# Patient Record
Sex: FEMALE | Race: WHITE | NOT HISPANIC OR LATINO | Employment: OTHER | URBAN - NONMETROPOLITAN AREA
[De-identification: names, ages, dates, MRNs, and addresses within clinical notes are randomized per-mention and may not be internally consistent; named-entity substitution may affect disease eponyms.]

---

## 2021-02-02 ENCOUNTER — IMMUNIZATION (OUTPATIENT)
Dept: VACCINE CLINIC | Facility: HOSPITAL | Age: 86
End: 2021-02-02

## 2021-02-02 PROCEDURE — 91300 HC SARSCOV02 VAC 30MCG/0.3ML IM: CPT | Performed by: FAMILY MEDICINE

## 2021-02-02 PROCEDURE — 0001A: CPT | Performed by: FAMILY MEDICINE

## 2021-02-23 ENCOUNTER — IMMUNIZATION (OUTPATIENT)
Dept: VACCINE CLINIC | Facility: HOSPITAL | Age: 86
End: 2021-02-23

## 2021-02-23 PROCEDURE — 91300 HC SARSCOV02 VAC 30MCG/0.3ML IM: CPT | Performed by: INTERNAL MEDICINE

## 2021-02-23 PROCEDURE — 0002A: CPT | Performed by: INTERNAL MEDICINE

## 2023-02-13 ENCOUNTER — OFFICE VISIT (OUTPATIENT)
Dept: CARDIOLOGY | Facility: CLINIC | Age: 88
End: 2023-02-13
Payer: MEDICARE

## 2023-02-13 VITALS
HEART RATE: 81 BPM | OXYGEN SATURATION: 95 % | WEIGHT: 158.6 LBS | HEIGHT: 66 IN | DIASTOLIC BLOOD PRESSURE: 70 MMHG | BODY MASS INDEX: 25.49 KG/M2 | SYSTOLIC BLOOD PRESSURE: 132 MMHG

## 2023-02-13 DIAGNOSIS — G47.33 OSA (OBSTRUCTIVE SLEEP APNEA): ICD-10-CM

## 2023-02-13 DIAGNOSIS — I73.9 PAD (PERIPHERAL ARTERY DISEASE): ICD-10-CM

## 2023-02-13 DIAGNOSIS — F17.211 CIGARETTE NICOTINE DEPENDENCE IN REMISSION: ICD-10-CM

## 2023-02-13 DIAGNOSIS — I25.10 CORONARY ARTERY DISEASE INVOLVING NATIVE CORONARY ARTERY OF NATIVE HEART WITHOUT ANGINA PECTORIS: ICD-10-CM

## 2023-02-13 DIAGNOSIS — I49.3 PREMATURE VENTRICULAR BEATS: ICD-10-CM

## 2023-02-13 DIAGNOSIS — I71.40 ABDOMINAL AORTIC ANEURYSM (AAA) WITHOUT RUPTURE, UNSPECIFIED PART: ICD-10-CM

## 2023-02-13 DIAGNOSIS — I10 ESSENTIAL HYPERTENSION: ICD-10-CM

## 2023-02-13 DIAGNOSIS — R06.02 SHORTNESS OF BREATH: Primary | ICD-10-CM

## 2023-02-13 DIAGNOSIS — Z95.828 HISTORY OF ENDOVASCULAR STENT GRAFT FOR ABDOMINAL AORTIC ANEURYSM: ICD-10-CM

## 2023-02-13 DIAGNOSIS — E78.5 DYSLIPIDEMIA: ICD-10-CM

## 2023-02-13 PROCEDURE — 99204 OFFICE O/P NEW MOD 45 MIN: CPT | Performed by: SPECIALIST

## 2023-02-13 RX ORDER — ASPIRIN 81 MG/1
1 TABLET, COATED ORAL DAILY
COMMUNITY
Start: 2022-12-05

## 2023-02-13 RX ORDER — ROSUVASTATIN CALCIUM 10 MG/1
TABLET, COATED ORAL
COMMUNITY
Start: 2023-02-07 | End: 2023-03-30 | Stop reason: SDUPTHER

## 2023-02-13 RX ORDER — LOSARTAN POTASSIUM 50 MG/1
1 TABLET ORAL DAILY
COMMUNITY
Start: 2022-12-03 | End: 2023-03-30 | Stop reason: SDUPTHER

## 2023-02-13 RX ORDER — OXYBUTYNIN CHLORIDE 5 MG/1
1 TABLET ORAL EVERY 12 HOURS SCHEDULED
COMMUNITY
Start: 2022-12-05

## 2023-02-13 NOTE — PROGRESS NOTES
Subjective   Initial consultation, abnormal EKG, CAD, PAD  Rashida Worthington is a 87 y.o. female who presents to day for Establish Care (ABNORMAL EKG-DENIES CARDIAC SYMPTOMS).    CHIEF COMPLIANT  Chief Complaint   Patient presents with   • Establish Care     ABNORMAL EKG-DENIES CARDIAC SYMPTOMS       Active Problems:  Problem List Items Addressed This Visit        Cardiac and Vasculature    PAD (peripheral artery disease) (HCC)    Relevant Orders    US AAA Screen Limited    AAA (abdominal aortic aneurysm) without rupture    Relevant Orders    US AAA Screen Limited    Dyslipidemia    Relevant Orders    Lipid Panel    Comprehensive Metabolic Panel    Essential hypertension    Relevant Medications    losartan (COZAAR) 50 MG tablet    Premature ventricular beats    Relevant Orders    Cardiac Event Monitor    Coronary artery disease involving native coronary artery of native heart without angina pectoris    Relevant Orders    Adult Transthoracic Echo Complete w/ Color, Spectral and Contrast if necessary per protocol       Pulmonary and Pneumonias    Shortness of breath - Primary    Relevant Orders    Adult Transthoracic Echo Complete w/ Color, Spectral and Contrast if necessary per protocol       Sleep    MADDIE (obstructive sleep apnea)    Relevant Orders    Home Sleep Study       Tobacco    Cigarette nicotine dependence in remission       HPI  HPI  Patient is referred because of abnormal EKG where she has also premature ventricular complexes she herself does not have any palpitations no history of chest pain but she does have a little bit of shortness of breath on exertion she did have some edema but this has resolved after she has some knee injection she is she used to smoke a little bit less than a pack per day for almost 50 years she quitted about 20 years ago after she had aortic abdominal aortic aneurysm repair and also bilateral femoral pop bypass with Dr. Smyth this has not been followed up regularly since he denies  any claudications she does have high blood pressure no diabetes but she does have hyperlipidemia she has been taking medicine for that but she does not know how is her cholesterol doing, no significant family history of coronary disease she has been using home oxygen at night she said that she is tired and she gets a little bit sleepy during the day and she snores quite a bit at night  PRIOR MEDS  Current Outpatient Medications on File Prior to Visit   Medication Sig Dispense Refill   • Aspirin Low Dose 81 MG EC tablet Take 1 tablet by mouth Daily.     • Calcium Citrate-Vitamin D (Citrus Calcium/Vitamin D) 200-6.25 MG-MCG tablet Take  by mouth.     • losartan (COZAAR) 50 MG tablet Take 1 tablet by mouth Daily.     • oxybutynin (DITROPAN) 5 MG tablet Take 1 tablet by mouth Every 12 (Twelve) Hours.     • rosuvastatin (CRESTOR) 10 MG tablet        No current facility-administered medications on file prior to visit.       ALLERGIES  Penicillins    HISTORY  Past Medical History:   Diagnosis Date   • Acid reflux    • Arthritis    • Colon polyps    • COPD (chronic obstructive pulmonary disease) (HCC)    • Hyperlipidemia    • Hypertension    • Osteoporosis    • Thyroid disease        Social History     Socioeconomic History   • Marital status:    Tobacco Use   • Smoking status: Former     Types: Cigarettes   Vaping Use   • Vaping Use: Never used   Substance and Sexual Activity   • Alcohol use: Never   • Sexual activity: Defer       Family History   Problem Relation Age of Onset   • Heart failure Mother    • Cancer Sister        Review of Systems   Constitutional: Negative.    HENT: Negative.    Eyes: Negative.    Respiratory: Positive for shortness of breath. Negative for apnea, cough, choking, chest tightness, wheezing and stridor.    Cardiovascular: Positive for leg swelling. Negative for chest pain and palpitations.   Gastrointestinal: Negative.    Endocrine: Negative.    Genitourinary: Negative.   "  Musculoskeletal: Negative.    Skin: Negative.    Allergic/Immunologic: Negative.    Neurological: Negative.    Hematological: Negative.    Psychiatric/Behavioral: Negative.        Objective     VITALS: /70   Pulse 81   Ht 167.6 cm (66\")   Wt 71.9 kg (158 lb 9.6 oz)   SpO2 95%   BMI 25.60 kg/m²     LABS:   Lab Results (most recent)     None          IMAGING:   No Images in the past 120 days found..    EXAM:  Physical Exam  Vitals reviewed.   Constitutional:       Appearance: She is well-developed.   HENT:      Head: Normocephalic and atraumatic.   Eyes:      Pupils: Pupils are equal, round, and reactive to light.   Neck:      Thyroid: No thyromegaly.      Vascular: No JVD.   Cardiovascular:      Rate and Rhythm: Normal rate and regular rhythm.      Heart sounds: Normal heart sounds. No murmur heard.    No friction rub. No gallop.   Pulmonary:      Effort: Pulmonary effort is normal. No respiratory distress.      Breath sounds: Normal breath sounds. No stridor. No wheezing or rales.   Chest:      Chest wall: No tenderness.   Musculoskeletal:         General: No tenderness or deformity.      Cervical back: Neck supple.   Skin:     General: Skin is warm and dry.   Neurological:      Mental Status: She is alert and oriented to person, place, and time.      Cranial Nerves: No cranial nerve deficit.      Coordination: Coordination normal.         Procedure   Procedures       Assessment & Plan     Diagnoses and all orders for this visit:    1. Shortness of breath (Primary)  -     Adult Transthoracic Echo Complete w/ Color, Spectral and Contrast if necessary per protocol; Future    2. PAD (peripheral artery disease) (HCC)  -     US AAA Screen Limited; Future    3. Abdominal aortic aneurysm (AAA) without rupture, unspecified part  -     US AAA Screen Limited; Future    4. Dyslipidemia  -     Lipid Panel; Future  -     Comprehensive Metabolic Panel; Future    5. Cigarette nicotine dependence in remission    6. " Essential hypertension    7. MADDIE (obstructive sleep apnea)  -     Home Sleep Study; Future    8. Premature ventricular beats  -     Cardiac Event Monitor; Future    9. Coronary artery disease involving native coronary artery of native heart without angina pectoris  -     Adult Transthoracic Echo Complete w/ Color, Spectral and Contrast if necessary per protocol; Future    1.  Patient was found to have fugitive of ventricular complexes on an EKG recently she herself does not have any palpitations as she did have a cardiac catheterization done for her on 2/15 which had minor nonobstructive coronary artery disease at the time, she is denying any chest pain or told but she does have shortness of breath [place] on exertion so I am going to get an echocardiogram to assess her cardiac function wall motion and valve morphology also because of the premature complexes I am going to get the monitor  2.  Her blood pressure is well controlled we will continue current management  3.  Apparently she had history of abdominal aortic aneurysm as well as peripheral vascular disease status post open surgical correction of the aortic aneurysm and bifemoral bypass so I am going to get an ultrasound of her abdomen to check the size of the aorta  4.  Her symptoms are consistent with sleep apnea going to refer her for home sleep study  5.  I will check her lipid profile she is on rosuvastatin 10 mg  6.  Also check her renal function    Return in about 4 weeks (around 3/13/2023).      Advance Care Planning   ACP discussion was held with the patient during this visit. Patient has an advance directive (not in EMR), copy requested.             MEDS ORDERED DURING VISIT:  No orders of the defined types were placed in this encounter.      As always, Curtis Santoyo MD  I appreciate very much the opportunity to participate in the cardiovascular care of your patients. Please do not hesitate to call me with any questions with regards to Rashida SHAW  Elin evaluation and management.         This document has been electronically signed by Danielle Abel MD  February 13, 2023 15:54 EST    This note is dictated utilizing voice recognition software.  Although this record has been proof read, transcriptional errors may still be present.

## 2023-03-02 PROBLEM — Z95.828 HISTORY OF ENDOVASCULAR STENT GRAFT FOR ABDOMINAL AORTIC ANEURYSM: Status: ACTIVE | Noted: 2023-03-02

## 2023-03-06 ENCOUNTER — APPOINTMENT (OUTPATIENT)
Dept: ULTRASOUND IMAGING | Facility: HOSPITAL | Age: 88
End: 2023-03-06
Payer: MEDICARE

## 2023-03-06 ENCOUNTER — LAB (OUTPATIENT)
Dept: LAB | Facility: HOSPITAL | Age: 88
End: 2023-03-06
Payer: MEDICARE

## 2023-03-06 ENCOUNTER — HOSPITAL ENCOUNTER (OUTPATIENT)
Dept: CARDIOLOGY | Facility: HOSPITAL | Age: 88
Discharge: HOME | End: 2023-03-06
Payer: MEDICARE

## 2023-03-06 DIAGNOSIS — E78.5 DYSLIPIDEMIA: ICD-10-CM

## 2023-03-06 DIAGNOSIS — I25.10 CORONARY ARTERY DISEASE INVOLVING NATIVE CORONARY ARTERY OF NATIVE HEART WITHOUT ANGINA PECTORIS: ICD-10-CM

## 2023-03-06 DIAGNOSIS — R06.02 SHORTNESS OF BREATH: ICD-10-CM

## 2023-03-06 LAB
ALBUMIN SERPL-MCNC: 4.5 G/DL (ref 3.5–5.2)
ALBUMIN/GLOB SERPL: 1.6 G/DL
ALP SERPL-CCNC: 66 U/L (ref 39–117)
ALT SERPL W P-5'-P-CCNC: 18 U/L (ref 1–33)
ANION GAP SERPL CALCULATED.3IONS-SCNC: 10.1 MMOL/L (ref 5–15)
AST SERPL-CCNC: 26 U/L (ref 1–32)
BH CV ECHO MEAS - AO MAX PG: 9.3 MMHG
BH CV ECHO MEAS - AO MEAN PG: 5.5 MMHG
BH CV ECHO MEAS - AO ROOT DIAM: 2.9 CM
BH CV ECHO MEAS - AO V2 MAX: 141.9 CM/SEC
BH CV ECHO MEAS - AO V2 VTI: 28.5 CM
BH CV ECHO MEAS - AVA(I,D): 2.09 CM2
BH CV ECHO MEAS - EDV(CUBED): 54.9 ML
BH CV ECHO MEAS - EDV(MOD-SP4): 30.5 ML
BH CV ECHO MEAS - EF(MOD-SP4): 55.7 %
BH CV ECHO MEAS - ESV(CUBED): 27 ML
BH CV ECHO MEAS - ESV(MOD-SP4): 13.5 ML
BH CV ECHO MEAS - FS: 21.1 %
BH CV ECHO MEAS - IVS/LVPW: 1 CM
BH CV ECHO MEAS - IVSD: 1.1 CM
BH CV ECHO MEAS - LA DIMENSION: 3.5 CM
BH CV ECHO MEAS - LAT PEAK E' VEL: 7.8 CM/SEC
BH CV ECHO MEAS - LV DIASTOLIC VOL/BSA (35-75): 16.9 CM2
BH CV ECHO MEAS - LV MASS(C)D: 134.7 GRAMS
BH CV ECHO MEAS - LV MAX PG: 4 MMHG
BH CV ECHO MEAS - LV MEAN PG: 2 MMHG
BH CV ECHO MEAS - LV SYSTOLIC VOL/BSA (12-30): 7.5 CM2
BH CV ECHO MEAS - LV V1 MAX: 99.8 CM/SEC
BH CV ECHO MEAS - LV V1 VTI: 21 CM
BH CV ECHO MEAS - LVIDD: 3.8 CM
BH CV ECHO MEAS - LVIDS: 3 CM
BH CV ECHO MEAS - LVOT AREA: 2.8 CM2
BH CV ECHO MEAS - LVOT DIAM: 1.9 CM
BH CV ECHO MEAS - LVPWD: 1.1 CM
BH CV ECHO MEAS - MED PEAK E' VEL: 5.8 CM/SEC
BH CV ECHO MEAS - MV A MAX VEL: 111 CM/SEC
BH CV ECHO MEAS - MV E MAX VEL: 71.6 CM/SEC
BH CV ECHO MEAS - MV E/A: 0.65
BH CV ECHO MEAS - PA ACC TIME: 0.07 SEC
BH CV ECHO MEAS - PA PR(ACCEL): 47.5 MMHG
BH CV ECHO MEAS - SI(MOD-SP4): 9.4 ML/M2
BH CV ECHO MEAS - SV(LVOT): 59.5 ML
BH CV ECHO MEAS - SV(MOD-SP4): 17 ML
BH CV ECHO MEAS - TAPSE (>1.6): 2.47 CM
BH CV ECHO MEASUREMENTS AVERAGE E/E' RATIO: 10.53
BILIRUB SERPL-MCNC: 0.7 MG/DL (ref 0–1.2)
BUN SERPL-MCNC: 30 MG/DL (ref 8–23)
BUN/CREAT SERPL: 25.9 (ref 7–25)
CALCIUM SPEC-SCNC: 9.9 MG/DL (ref 8.6–10.5)
CHLORIDE SERPL-SCNC: 106 MMOL/L (ref 98–107)
CHOLEST SERPL-MCNC: 128 MG/DL (ref 0–200)
CO2 SERPL-SCNC: 25.9 MMOL/L (ref 22–29)
CREAT SERPL-MCNC: 1.16 MG/DL (ref 0.57–1)
EGFRCR SERPLBLD CKD-EPI 2021: 45.7 ML/MIN/1.73
GLOBULIN UR ELPH-MCNC: 2.8 GM/DL
GLUCOSE SERPL-MCNC: 90 MG/DL (ref 65–99)
HDLC SERPL-MCNC: 46 MG/DL (ref 40–60)
LDLC SERPL CALC-MCNC: 64 MG/DL (ref 0–100)
LDLC/HDLC SERPL: 1.38 {RATIO}
LEFT ATRIUM VOLUME INDEX: 14.2 ML/M2
MAXIMAL PREDICTED HEART RATE: 133 BPM
POTASSIUM SERPL-SCNC: 4.4 MMOL/L (ref 3.5–5.2)
PROT SERPL-MCNC: 7.3 G/DL (ref 6–8.5)
SODIUM SERPL-SCNC: 142 MMOL/L (ref 136–145)
STRESS TARGET HR: 113 BPM
TRIGL SERPL-MCNC: 93 MG/DL (ref 0–150)
VLDLC SERPL-MCNC: 18 MG/DL (ref 5–40)

## 2023-03-06 PROCEDURE — 80053 COMPREHEN METABOLIC PANEL: CPT

## 2023-03-06 PROCEDURE — 93306 TTE W/DOPPLER COMPLETE: CPT

## 2023-03-06 PROCEDURE — 36415 COLL VENOUS BLD VENIPUNCTURE: CPT

## 2023-03-06 PROCEDURE — 93306 TTE W/DOPPLER COMPLETE: CPT | Performed by: SPECIALIST

## 2023-03-06 PROCEDURE — 80061 LIPID PANEL: CPT

## 2023-03-10 ENCOUNTER — TELEPHONE (OUTPATIENT)
Dept: CARDIOLOGY | Facility: CLINIC | Age: 88
End: 2023-03-10
Payer: MEDICARE

## 2023-03-10 NOTE — TELEPHONE ENCOUNTER
Hub staff attempted to follow warm transfer process and was unsuccessful     Caller: MOHINI    Relationship to patient: DAUGHTER    Best call back number: [phone]    Patient is needing: PT'S DAUGHTER CALLED IN TO SPEAK WITH SOMEONE REGARDING HER TEST RESULTS BEING SENT OVER TODAY.

## 2023-03-14 ENCOUNTER — TELEPHONE (OUTPATIENT)
Dept: CARDIOLOGY | Facility: CLINIC | Age: 88
End: 2023-03-14
Payer: MEDICARE

## 2023-03-14 ENCOUNTER — TREATMENT (OUTPATIENT)
Dept: CARDIOLOGY | Facility: CLINIC | Age: 88
End: 2023-03-14
Payer: MEDICARE

## 2023-03-14 DIAGNOSIS — I49.3 PREMATURE VENTRICULAR BEATS: ICD-10-CM

## 2023-03-14 NOTE — TELEPHONE ENCOUNTER
Called pt and spoke with Gabi she stated pt had a cardioid US done in Jan of 2023. Report is in her chart.   She also stated that she has contacted the home sleep study place and they are a back order on her supplies for the test and wanted to know if it was okay to still keep her apt on the 30th to go over the test results.

## 2023-03-30 ENCOUNTER — OFFICE VISIT (OUTPATIENT)
Dept: CARDIOLOGY | Facility: CLINIC | Age: 88
End: 2023-03-30
Payer: MEDICARE

## 2023-03-30 VITALS
OXYGEN SATURATION: 95 % | HEIGHT: 66 IN | DIASTOLIC BLOOD PRESSURE: 82 MMHG | BODY MASS INDEX: 25.26 KG/M2 | HEART RATE: 76 BPM | WEIGHT: 157.2 LBS | RESPIRATION RATE: 18 BRPM | SYSTOLIC BLOOD PRESSURE: 130 MMHG

## 2023-03-30 DIAGNOSIS — I25.10 CORONARY ARTERY DISEASE INVOLVING NATIVE CORONARY ARTERY OF NATIVE HEART WITHOUT ANGINA PECTORIS: ICD-10-CM

## 2023-03-30 DIAGNOSIS — I10 ESSENTIAL HYPERTENSION: Primary | ICD-10-CM

## 2023-03-30 DIAGNOSIS — F17.211 CIGARETTE NICOTINE DEPENDENCE IN REMISSION: ICD-10-CM

## 2023-03-30 DIAGNOSIS — I49.3 PREMATURE VENTRICULAR BEATS: ICD-10-CM

## 2023-03-30 DIAGNOSIS — I73.9 PAD (PERIPHERAL ARTERY DISEASE): ICD-10-CM

## 2023-03-30 DIAGNOSIS — R06.83 SNORING: ICD-10-CM

## 2023-03-30 DIAGNOSIS — E78.5 DYSLIPIDEMIA: ICD-10-CM

## 2023-03-30 DIAGNOSIS — I71.40 ABDOMINAL AORTIC ANEURYSM (AAA) WITHOUT RUPTURE, UNSPECIFIED PART: ICD-10-CM

## 2023-03-30 DIAGNOSIS — G47.33 OSA (OBSTRUCTIVE SLEEP APNEA): ICD-10-CM

## 2023-03-30 DIAGNOSIS — Z95.828 HISTORY OF ENDOVASCULAR STENT GRAFT FOR ABDOMINAL AORTIC ANEURYSM: ICD-10-CM

## 2023-03-30 DIAGNOSIS — R06.02 SHORTNESS OF BREATH: ICD-10-CM

## 2023-03-30 RX ORDER — LOSARTAN POTASSIUM 50 MG/1
50 TABLET ORAL DAILY
Qty: 90 TABLET | Refills: 1 | Status: SHIPPED | OUTPATIENT
Start: 2023-03-30

## 2023-03-30 RX ORDER — ROSUVASTATIN CALCIUM 10 MG/1
10 TABLET, COATED ORAL DAILY
Qty: 90 TABLET | Refills: 1 | Status: SHIPPED | OUTPATIENT
Start: 2023-03-30

## 2023-03-30 NOTE — PROGRESS NOTES
Subjective   Follow up, echocardiogram result  Rashida Worthington is a 87 y.o. female who presents to day for Follow-up (1 month) and Results (Echo and monitor).    CHIEF COMPLIANT  Chief Complaint   Patient presents with   • Follow-up     1 month   • Results     Echo and monitor       Active Problems:  Problem List Items Addressed This Visit        Cardiac and Vasculature    PAD (peripheral artery disease) (HCC)    AAA (abdominal aortic aneurysm) without rupture    Dyslipidemia    Relevant Medications    rosuvastatin (CRESTOR) 10 MG tablet    Other Relevant Orders    Lipid Panel    Comprehensive Metabolic Panel    Essential hypertension - Primary    Relevant Medications    losartan (COZAAR) 50 MG tablet    Premature ventricular beats    Coronary artery disease involving native coronary artery of native heart without angina pectoris    History of endovascular stent graft for abdominal aortic aneurysm       Pulmonary and Pneumonias    Shortness of breath       Sleep    Snoring       Tobacco    Cigarette nicotine dependence in remission       HPI  HPI  She says she has been doing really well she is active with no palpitations no chest pain minimal shortness of breath and intermittent pedal edema  PRIOR MEDS  Current Outpatient Medications on File Prior to Visit   Medication Sig Dispense Refill   • Aspirin Low Dose 81 MG EC tablet Take 1 tablet by mouth Daily.     • Calcium Citrate-Vitamin D (Citrus Calcium/Vitamin D) 200-6.25 MG-MCG tablet Take  by mouth.     • oxybutynin (DITROPAN) 5 MG tablet Take 1 tablet by mouth Every 12 (Twelve) Hours.     • [DISCONTINUED] losartan (COZAAR) 50 MG tablet Take 1 tablet by mouth Daily.     • [DISCONTINUED] rosuvastatin (CRESTOR) 10 MG tablet        No current facility-administered medications on file prior to visit.       ALLERGIES  Penicillins    HISTORY  Past Medical History:   Diagnosis Date   • Abnormal ECG    • Acid reflux    • Aneurysm ([facility]) 2014   • Arthritis    • Asthma    •  "Colon polyps    • COPD (chronic obstructive pulmonary disease) (HCC)    • Hyperlipidemia    • Hypertension    • Osteoporosis    • Thyroid disease        Social History     Socioeconomic History   • Marital status:    Tobacco Use   • Smoking status: Former     Packs/day: 0.50     Years: 20.00     Pack years: 10.00     Types: Cigarettes     Start date: 1950     Quit date: 2014     Years since quittin.2   Vaping Use   • Vaping Use: Never used   Substance and Sexual Activity   • Alcohol use: Never   • Drug use: Never   • Sexual activity: Not Currently     Partners: Male       Family History   Problem Relation Age of Onset   • Heart failure Mother    • Cancer Sister        Review of Systems   Respiratory: Positive for shortness of breath. Negative for apnea, cough, choking, chest tightness, wheezing and stridor.    Cardiovascular: Positive for leg swelling. Negative for chest pain and palpitations.       Objective     VITALS: /82 (BP Location: Right arm, Patient Position: Sitting, Cuff Size: Adult)   Pulse 76   Resp 18   Ht 167.6 cm (66\")   Wt 71.3 kg (157 lb 3.2 oz)   SpO2 95%   BMI 25.37 kg/m²     LABS:   Lab Results (most recent)     None          IMAGING:   No Images in the past 120 days found..    EXAM:  Physical Exam  Vitals reviewed.   Constitutional:       Appearance: She is well-developed.   HENT:      Head: Normocephalic and atraumatic.   Eyes:      Pupils: Pupils are equal, round, and reactive to light.   Neck:      Thyroid: No thyromegaly.      Vascular: No JVD.   Cardiovascular:      Rate and Rhythm: Normal rate. Rhythm irregular.      Heart sounds: Normal heart sounds. No murmur heard.    No friction rub. No gallop.      Comments: Occasional extrasystoles  1/6 ejection systolic murmur heard best at the aortic area  Pulmonary:      Effort: Pulmonary effort is normal. No respiratory distress.      Breath sounds: Normal breath sounds. No stridor. No wheezing or rales. "   Chest:      Chest wall: No tenderness.   Musculoskeletal:         General: No tenderness or deformity.      Cervical back: Neck supple.   Skin:     General: Skin is warm and dry.   Neurological:      Mental Status: She is alert and oriented to person, place, and time.      Cranial Nerves: No cranial nerve deficit.      Coordination: Coordination normal.         Procedure   Procedures       Assessment & Plan     Diagnoses and all orders for this visit:    1. Essential hypertension (Primary)  -     losartan (COZAAR) 50 MG tablet; Take 1 tablet by mouth Daily.  Dispense: 90 tablet; Refill: 1    2. Coronary artery disease involving native coronary artery of native heart without angina pectoris    3. Dyslipidemia  -     rosuvastatin (CRESTOR) 10 MG tablet; Take 1 tablet by mouth Daily.  Dispense: 90 tablet; Refill: 1  -     Lipid Panel; Future  -     Comprehensive Metabolic Panel; Future    4. Abdominal aortic aneurysm (AAA) without rupture, unspecified part ([facility])    5. History of endovascular stent graft for abdominal aortic aneurysm    6. PAD (peripheral artery disease) ([facility])    7. Premature ventricular beats    8. Shortness of breath    9. Cigarette nicotine dependence in remission    10. Snoring    1.  Her blood pressure is well controlled we will continue with the current management  2.  She is completely asymptomatic we discussed the results of the event monitor with showing rare premature ventricular complexes but an 11 ends of SVT the longest was 12 beats at a rate of 190 with all of this she has been completely asymptomatic I am concerned about starting her on a beta-blocker yet because also she has first-degree AV block and as she is completely asymptomatic I just advised her to cut down the caffeine intake  3.  Discussed the echocardiogram which showed normal systolic function but with diastolic dysfunction advised her to eat low-salt diet  4.  I reviewed also her lipid profile which is excellent  5.  She  just had the abdominal aortic ultrasound done yesterday unfortunately the report is not available yet so we will await for the report  6.  Still not smoking  7.  We also talked about the results of the sleep study which was showing an AHI of 0.9 sleep hygiene discussed otherwise we will continue current management    Return in about 6 months (around 9/30/2023).                 MEDS ORDERED DURING VISIT:  New Medications Ordered This Visit   Medications   • losartan (COZAAR) 50 MG tablet     Sig: Take 1 tablet by mouth Daily.     Dispense:  90 tablet     Refill:  1   • rosuvastatin (CRESTOR) 10 MG tablet     Sig: Take 1 tablet by mouth Daily.     Dispense:  90 tablet     Refill:  1       As always, Jayleen Modi APRN  I appreciate very much the opportunity to participate in the cardiovascular care of your patients. Please do not hesitate to call me with any questions with regards to Rashida Worthington evaluation and management.         This document has been electronically signed by Danielle Abel MD  March 30, 2023 11:39 EDT    This note is dictated utilizing voice recognition software.

## 2023-09-28 ENCOUNTER — TELEPHONE (OUTPATIENT)
Dept: CARDIOLOGY | Facility: CLINIC | Age: 88
End: 2023-09-28
Payer: MEDICARE

## 2023-09-28 NOTE — TELEPHONE ENCOUNTER
HUB CAN READ  Called pt to inform them of labs needed for upcoming appointment on 10/2 no answer left VM for call back.   These are fasting labs, she can have them done at the diagnostic center or the hospital.

## 2023-11-12 DIAGNOSIS — I10 ESSENTIAL HYPERTENSION: ICD-10-CM

## 2023-11-13 NOTE — TELEPHONE ENCOUNTER
Hub can read.     Called pt to advise that she will need to have labs done before we can send in her medications. No answer LM.     Most recent labs from PCP was in January/2023.

## 2023-11-16 RX ORDER — LOSARTAN POTASSIUM 50 MG/1
50 TABLET ORAL DAILY
Qty: 90 TABLET | Refills: 1 | OUTPATIENT
Start: 2023-11-16

## 2023-11-16 NOTE — TELEPHONE ENCOUNTER
Pt's daughter, on HIPAA, stated she didn't need refills, she didn't want to complete the labs or keep follow up appointment. I advised her that she wouldn't be able to get refills on this medication or others if she didn't get labs or keep follow ups.

## 2023-11-16 NOTE — TELEPHONE ENCOUNTER
Spoke with pt's daughter Agustina, on HIPAA, she states that her mother hadn't mentioned needing refills and she would talk to her about it. I let her know that she needed to get the labs for an upcoming appointment as well. Agustina let me know that they had canceled the appointment as her mother hadn't had any more problems. I informed her that she is also taking crestor that was also prescribed by us and that if she doesn't get her labs and keep her follow up appointments that we wouldn't be able to continue giving her the medications. She verbalized understanding and stated that she would discuss with her mother.

## 2024-01-23 DIAGNOSIS — I10 ESSENTIAL HYPERTENSION: ICD-10-CM

## 2024-01-24 RX ORDER — LOSARTAN POTASSIUM 50 MG/1
50 TABLET ORAL DAILY
Qty: 90 TABLET | Refills: 1 | Status: SHIPPED | OUTPATIENT
Start: 2024-01-24

## 2024-06-04 DIAGNOSIS — E78.5 DYSLIPIDEMIA: ICD-10-CM

## 2024-06-04 RX ORDER — ROSUVASTATIN CALCIUM 10 MG/1
10 TABLET, COATED ORAL DAILY
Qty: 90 TABLET | Refills: 1 | OUTPATIENT
Start: 2024-06-04

## 2024-06-04 NOTE — TELEPHONE ENCOUNTER
Hub to relay.     Pt will need to be seen before receiving refills. She was last seen on 03/30/2023.